# Patient Record
Sex: FEMALE | Race: WHITE
[De-identification: names, ages, dates, MRNs, and addresses within clinical notes are randomized per-mention and may not be internally consistent; named-entity substitution may affect disease eponyms.]

---

## 2018-06-14 ENCOUNTER — HOSPITAL ENCOUNTER (OUTPATIENT)
Dept: HOSPITAL 96 - M.ULTRA | Age: 73
End: 2018-06-14
Attending: FAMILY MEDICINE
Payer: MEDICARE
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
## 2018-06-14 DIAGNOSIS — E07.9: ICD-10-CM

## 2018-06-14 DIAGNOSIS — I10: ICD-10-CM

## 2018-06-14 DIAGNOSIS — K76.9: ICD-10-CM

## 2018-06-14 DIAGNOSIS — Z12.31: Primary | ICD-10-CM

## 2018-06-14 DIAGNOSIS — S32.010D: ICD-10-CM

## 2018-06-14 DIAGNOSIS — I70.8: ICD-10-CM

## 2018-06-14 DIAGNOSIS — D73.4: ICD-10-CM

## 2018-06-14 DIAGNOSIS — N28.89: ICD-10-CM

## 2018-06-14 DIAGNOSIS — M81.0: ICD-10-CM

## 2018-10-17 ENCOUNTER — HOSPITAL ENCOUNTER (OUTPATIENT)
Dept: HOSPITAL 96 - M.SLEEPLAB | Age: 73
End: 2018-10-17
Attending: FAMILY MEDICINE
Payer: MEDICARE

## 2018-10-17 DIAGNOSIS — E55.9: ICD-10-CM

## 2018-10-17 DIAGNOSIS — I73.9: ICD-10-CM

## 2018-10-17 DIAGNOSIS — R09.02: ICD-10-CM

## 2018-10-17 DIAGNOSIS — R41.89: ICD-10-CM

## 2018-10-17 DIAGNOSIS — G47.33: Primary | ICD-10-CM

## 2018-10-17 DIAGNOSIS — J43.9: ICD-10-CM

## 2018-10-25 NOTE — SLEEP
[facility] 
[address] [place]  [address]                    SLEEP STUDY REPORT            
_______________________________________________________________________________
 
Name:       HUA BAL            Room:                      REG CLI 
M.R.#:  [id number]      Account #:      [id number]  
Admission:  10/17/18     Attend Phys:    MARIANA Joshi
Discharge:               Date of Birth:  [birth year]  
         Report #: [id number]
                                                                     [id number]  
_______________________________________________________________________________
THIS REPORT FOR:  //name//                      
 
CC: Omayra Leach
 
This study has been reviewed in its entirety by a board certified sleep
specialist
 
DATE OF SERVICE:  10/17/2018
 
 
The patient is 73 years old who weighs 150 pounds and is 60 inches tall with a
BMI of 29.3.  The patient's [place] score was 5.  The patient underwent home
sleep study performed by [facility].  Total recording time was 485
minutes.
 
During the night study, the patient had 69 obstructive apneas, no central or
mixed apneas and 13 hypopneas.  The patient's apnea hypopnea index was 10 per
hour.  No supine sleep was observed.
 
Nocturnal oximetry study revealed an average oxygen saturation of 92% with
lowest of 83%.  9.8 minutes were spent in oxygen saturation of less than 90%.
 
EKG monitoring revealed an average heart rate of 56 beats per minute with a
maximum of 73 beats per minute.
 
IMPRESSION:
1.  Mild sleep apnea-hypopnea syndrome at an AHI of 10 per hour.
2.  Mild nocturnal hypoxia secondary to obstructive sleep apnea.
 
RECOMMENDATIONS:
1.  Due to low AHI, the patient does not meet the criteria for CPAP initiation.
2.  If the patient is clinically symptomatic or has co-morbid conditions,
then the patient's mild sleep apnea can be treated with either a trial
  oral appliance as recommended by the dentist versus a trial of CPAP
titration.
3.  Weight loss is advised.
4.  Avoid CNS depressants.
5.  Cautioned regarding driving until symptoms of sleep apnea resolve with the
above recommendations.
 
 
 
 
 
<ELECTRONICALLY SIGNED>
                                        By:  Panchito [place] MD              
10/25/18     [id number]
D: 10/24/18 2211_______________________________________
T: 10/24/18 [id number] [place] MD                 /nt

## 2019-07-18 ENCOUNTER — HOSPITAL ENCOUNTER (OUTPATIENT)
Dept: HOSPITAL 96 - M.CT | Age: 74
End: 2019-07-18
Attending: FAMILY MEDICINE
Payer: MEDICARE

## 2019-07-18 DIAGNOSIS — M75.82: ICD-10-CM

## 2019-07-18 DIAGNOSIS — Z88.8: ICD-10-CM

## 2019-07-18 DIAGNOSIS — Z88.0: ICD-10-CM

## 2019-07-18 DIAGNOSIS — Z95.820: ICD-10-CM

## 2019-07-18 DIAGNOSIS — M19.012: Primary | ICD-10-CM

## 2019-07-18 DIAGNOSIS — M75.32: ICD-10-CM

## 2021-02-19 ENCOUNTER — HOSPITAL ENCOUNTER (OUTPATIENT)
Dept: HOSPITAL 96 - M.CT | Age: 76
End: 2021-02-19
Attending: FAMILY MEDICINE
Payer: MEDICARE

## 2021-02-19 DIAGNOSIS — G93.89: ICD-10-CM

## 2021-02-19 DIAGNOSIS — G40.109: ICD-10-CM

## 2021-02-19 DIAGNOSIS — I67.82: Primary | ICD-10-CM

## 2021-02-19 DIAGNOSIS — G31.9: ICD-10-CM
